# Patient Record
Sex: MALE | Race: OTHER | HISPANIC OR LATINO | URBAN - METROPOLITAN AREA
[De-identification: names, ages, dates, MRNs, and addresses within clinical notes are randomized per-mention and may not be internally consistent; named-entity substitution may affect disease eponyms.]

---
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
## 2022-05-13 NOTE — ED PEDIATRIC NURSE NOTE - DOES PATIENT HAVE ADVANCE DIRECTIVE
Impression: Vitreous degeneration, bilateral: H43.813. Plan: Discussed Dx of posterior vitreous detachment. Discussed signs and symptoms of retinal tear/detachment. Pt to RTC PRN with any change to visual status. No
